# Patient Record
Sex: FEMALE | Race: WHITE | URBAN - METROPOLITAN AREA
[De-identification: names, ages, dates, MRNs, and addresses within clinical notes are randomized per-mention and may not be internally consistent; named-entity substitution may affect disease eponyms.]

---

## 2018-12-31 ENCOUNTER — NEW PATIENT (OUTPATIENT)
Dept: URBAN - METROPOLITAN AREA CLINIC 56 | Facility: CLINIC | Age: 63
End: 2018-12-31
Payer: COMMERCIAL

## 2018-12-31 DIAGNOSIS — H43.811 VITREOUS DEGENERATION, RIGHT EYE: ICD-10-CM

## 2018-12-31 DIAGNOSIS — H25.13 AGE-RELATED NUCLEAR CATARACT, BILATERAL: ICD-10-CM

## 2018-12-31 DIAGNOSIS — Z79.899 OTHER LONG TERM (CURRENT) DRUG THERAPY: Primary | ICD-10-CM

## 2018-12-31 PROCEDURE — 92083 EXTENDED VISUAL FIELD XM: CPT | Performed by: OPTOMETRIST

## 2018-12-31 PROCEDURE — 92004 COMPRE OPH EXAM NEW PT 1/>: CPT | Performed by: OPTOMETRIST

## 2018-12-31 PROCEDURE — 92134 CPTRZ OPH DX IMG PST SGM RTA: CPT | Performed by: OPTOMETRIST

## 2018-12-31 ASSESSMENT — KERATOMETRY
OD: 43.47
OS: 43.66

## 2018-12-31 ASSESSMENT — VISUAL ACUITY
OD: 20/20
OS: 20/20

## 2018-12-31 ASSESSMENT — INTRAOCULAR PRESSURE
OD: 9
OS: 10

## 2021-09-02 ENCOUNTER — OFFICE VISIT (OUTPATIENT)
Dept: URBAN - METROPOLITAN AREA CLINIC 56 | Facility: CLINIC | Age: 66
End: 2021-09-02
Payer: MEDICARE

## 2021-09-02 DIAGNOSIS — H52.03 HYPERMETROPIA, BILATERAL: ICD-10-CM

## 2021-09-02 DIAGNOSIS — H40.033 ANATOMICAL NARROW ANGLE, BILATERAL: ICD-10-CM

## 2021-09-02 PROCEDURE — 92014 COMPRE OPH EXAM EST PT 1/>: CPT | Performed by: OPTOMETRIST

## 2021-09-02 ASSESSMENT — VISUAL ACUITY
OS: 20/20
OD: 20/20

## 2021-09-02 ASSESSMENT — KERATOMETRY
OD: 43.22
OS: 43.77

## 2021-09-02 ASSESSMENT — INTRAOCULAR PRESSURE
OS: 15
OD: 14

## 2021-09-02 NOTE — IMPRESSION/PLAN
Impression: Other long term (current) drug therapy: Z79.899.  Plan: Patient discontinued Plaquenil treatment  per doctors orders in the spring of 2019

## 2021-09-02 NOTE — IMPRESSION/PLAN
Impression: Anatomical narrow angle, bilateral: H40.033. Plan: Patent PI OU with adequate untreated IOP and no suspect optic disc changes.  Continue to monitor with an annual comprehensive exam

## 2024-09-26 ENCOUNTER — OFFICE VISIT (OUTPATIENT)
Dept: URBAN - METROPOLITAN AREA CLINIC 51 | Facility: CLINIC | Age: 69
End: 2024-09-26
Payer: MEDICARE

## 2024-09-26 DIAGNOSIS — H16.223 KERATOCONJUNCTIVITIS SICCA, BILATERAL: ICD-10-CM

## 2024-09-26 DIAGNOSIS — H25.13 AGE-RELATED NUCLEAR CATARACT, BILATERAL: Primary | ICD-10-CM

## 2024-09-26 DIAGNOSIS — H52.03 HYPERMETROPIA, BILATERAL: ICD-10-CM

## 2024-09-26 PROCEDURE — 92004 COMPRE OPH EXAM NEW PT 1/>: CPT | Performed by: OPHTHALMOLOGY

## 2024-09-26 ASSESSMENT — INTRAOCULAR PRESSURE
OS: 19
OD: 16

## 2025-01-02 ENCOUNTER — OFFICE VISIT (OUTPATIENT)
Dept: URBAN - METROPOLITAN AREA CLINIC 44 | Facility: CLINIC | Age: 70
End: 2025-01-02
Payer: MEDICARE

## 2025-01-02 DIAGNOSIS — H00.11 CHALAZION RIGHT UPPER LID: Primary | ICD-10-CM

## 2025-01-02 DIAGNOSIS — H00.015 HORDEOLUM, LEFT LOWER LID: ICD-10-CM

## 2025-01-02 PROCEDURE — 92012 INTRM OPH EXAM EST PATIENT: CPT | Performed by: OPHTHALMOLOGY

## 2025-01-02 RX ORDER — DOXYCYCLINE HYCLATE 50 MG/1
50 MG CAPSULE, GELATIN COATED ORAL
Qty: 21 | Refills: 0 | Status: ACTIVE
Start: 2025-01-02

## 2025-01-02 ASSESSMENT — INTRAOCULAR PRESSURE
OS: 17
OD: 16